# Patient Record
Sex: MALE | Race: OTHER | Employment: UNEMPLOYED | URBAN - METROPOLITAN AREA
[De-identification: names, ages, dates, MRNs, and addresses within clinical notes are randomized per-mention and may not be internally consistent; named-entity substitution may affect disease eponyms.]

---

## 2019-05-21 NOTE — BH LEVEL OF CARE ASSESSMENT
Level of Care Assessment Note    General Questions  Why are you here?: \"I tried to fuck all my shit up today so I had nothing to come back to. I have been having suicidal thoughts and still do but just tried to ruin everything I had going. \"  Precipitatin specifically say what his plan today was. He has a history of attempting to hang himself, slit his wrists and overdose.  PT stated he was trying to think of anything he could do today after messing up the areas of his life to ensure he would have nothing to and stopping him from getting any support. Depression Symptoms: Change in energy level;Feelings of worthlessness;Isolative; Feelings of helplessness; Loss of interest  Depression Description: Pt states he has nothing to live for and wants to end his life. illicit/prescription drugs have you used/abused?: Denies                                                                Support for Recovery  Is your living environment a supportive place for recovery?: No     Withdrawal Symptoms  History of Withdrawal Sym Pt trespassed on property today in order to get in trouble.    Thought Patterns  Clarity/Relevance: Coherent  Flow: Organized  Content: Paranoid ideation  Level of Consciousness: Alert  Level of Consciousness: Alert  Behavior  Exhibited behavior: Appropriat Yes    Primary Psychiatric Diagnosis  Depressive Disorders: Unspecified Depressive Disorder                               SRAT Review  Behavioral Precautions: Suicide  Medical Precautions: None

## 2019-05-21 NOTE — ED INITIAL ASSESSMENT (HPI)
Pt to ER via EMS for psych eval.  Pt states he was trespassing at [facility] and the called 911. Pt states he has been off his medications for 2 months. Pt states he was on Depakote and trazodone. Pt states he does have SI.  Pt states he struggles with tho

## 2019-05-22 PROBLEM — F25.9 SCHIZOAFFECTIVE DISORDER (HCC): Status: ACTIVE | Noted: 2019-05-22

## 2019-05-22 NOTE — BH PROGRESS NOTE
At this time, there is no clinically appropriate bed at [facility].  Lisbeth Fletcher, spoke to [facility] KAREEM HERNANDEZ, to confirm, and then this writer called and spoke with Glennis Cabot at [place], to explain that [facility] is working closely with [employer], and we will call [employer] in t

## 2019-05-22 NOTE — CONSULTS
[facility]    Report of Consultation    Medina Liaocat Patient Status:  Emergency    6/10/1995 MRN [id number]   Location [address] Attending No att. providers found   Hosp Day # 0 PCP None Pcp     Date of attempting to hang himself slitting his wrist and overdose. Patient reported that he has multiple plan in his mind how he can harm himself. Patient reported that there is nothing for him to live for.   Patient reported that he wish to go to [facility] so he The patient is alert and oriented x3. Stated age male well build laying down in bed very restless and disheveled. Patient demonstrating distracted and tangential thought process. Patient reporting feeling hopeless and helpless and depressed.   Patien prescriptions requested or ordered in this encounter           Deirdre Milligan MD  5/22/2019

## 2019-05-22 NOTE — ED PROVIDER NOTES
Patient Seen in: [facility] AND [facility] Emergency Department    History   Patient presents with:  Eval-P (psychiatric)    Stated Complaint: psych eval    HPI    Patient is a 21-year-old male who presents to the emergency department with a chief complaint of s oriented to person, place, and time. Skin: Skin is warm and dry. No rash noted. Psychiatric: His behavior is normal. His affect is labile. His speech is tangential. Cognition and memory are normal. He expresses impulsivity.  He expresses suicidal ideati Patient medically stable for psychiatric transfer.     MDM                         Disposition and Plan     Clinical Impression:  Paranoid schizophrenia ([facility] [address]  (primary encounter diagnosis)    Disposition:  Psychiatric transfer  5/21/2019  8:12 pm

## 2019-05-24 PROBLEM — F10.20 ALCOHOL USE DISORDER, SEVERE, DEPENDENCE (HCC): Status: ACTIVE | Noted: 2019-05-24

## 2019-06-04 NOTE — ED PROVIDER NOTES
Patient received in sign out from Dr. Conor Kidd. Awaits transfer for psychiatric evaluation and management for suicidal ideation and homicidal ideation. He has remained calm and cooperative throughout my shift. Signed out to Dr. Evette Espinoza.

## 2019-06-04 NOTE — ED NOTES
Pt ate 75% of his breakfast tray and has ambulated to the restroom. No further request at this time.
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
## 2019-06-04 NOTE — ED INITIAL ASSESSMENT (HPI)
Pt called 911 due to feeling \"Like I was going to go over the edge\" admits to SI. reports drinking 1/2 gal Vinicio Silva.  Non compliant with meds

## 2019-06-04 NOTE — ED NOTES
Eugene Singh @ [place] requested 24 hour monitoring of patient due to Etoh abuse. Requesting EKG, current vitals, and any medications given in ED.

## 2019-06-04 NOTE — ED INITIAL ASSESSMENT (HPI)
Pt reports that people are trying to hurt him and that he is prepared to defend himself if he needs to.  States that he would rather kill himself than kill others so he is suicidal.

## 2019-06-04 NOTE — ED PROVIDER NOTES
Patient Seen in: [facility] AND [facility] Emergency Department    History   Patient presents with:  Althea-P (psychiatric)    Stated Complaint: psych     HPI    25 yo M with PMH schizoaffective presneting for evaluation of suicidal/homicidal ideation with onset at 12-18 beer a day    Drug use: Yes      Types: Cannabis      Comment: started using approx 12 yrs ago. LSD, Heroin, Marijuana, cocaine, ecstasy.  Reports using only marijuana currently - 5/22/19      Review of Systems :  Constitutional: As per HPI  Psychiatr limits   VALPROIC ACID, (DEPAKENE) - Abnormal; Notable for the following components:    Valproic Acid 6.5 (*)     All other components within normal limits   CBC W/ DIFFERENTIAL - Abnormal; Notable for the following components:    WBC 13.5 (*)     Neutroph

## 2019-06-04 NOTE — ED NOTES
[facility] @ [place] requesting pt be monitored for alcohol withdrawal for 24 hours. On 6/5/19 in morning, please fax EKG, vitals and any meds given in ED.

## 2019-06-05 NOTE — PROGRESS NOTES
Sy Artis is a 24year old  single male with chronic history of schizoaffective disorder who presented to the hospital with alcohol blood concentration 233 reporting suicidal ideation with a plan to hang himself.   The patient seen today fo use: Yes      Types: Cannabis      Comment: started using approx 12 yrs ago. LSD, Heroin, Marijuana, cocaine, ecstasy.  Reports using only marijuana currently - 5/22/19       Medications (Active prior to today's visit):    Current Facility-Administered Medi severity.   The patient with history of bipolar disorder and recent episode of depression with multiple suicidal attempt in the past and instability of his emotion.  Patient has not been compliant on medication and demonstrating inability to care for self i

## 2019-06-05 NOTE — ED NOTES
Pt remains asleep. Po fluids within reach. Er tech remains outside room for seclusion. Will continue to monitor.

## 2019-06-05 NOTE — ED NOTES
Met with Zeke Lou. He continues to have suicidal thoughts/urges to hang himself. New Petition completed.

## 2019-06-05 NOTE — ED NOTES
Pt remains awake, calm, cooperative. PO fluids within reach. Er tech outside room for seclusion. Will continue to monitor.

## 2019-06-05 NOTE — ED NOTES
Kassandra Nolan from [facility] called back - deflected pt due to CIWA of 6 at 0500 6/5/19.  Kassandra Nolan requested we follow up with him tomorrow re: transfer request. Kassandra Nolan advised they would require pt to not be exhibiting withdrawal sx w/out medications in order to be trans

## 2019-06-05 NOTE — CONSULTS
[facility]    Report of Consultation    Catarina Guajardo Patient Status:  Emergency    6/10/1995 MRN [id number]   Location [address] Attending Brain Lee MD   Hosp Day # 0 PCP None Pcp     Date o that he has been drinking half a gallon of whiskey upon availability. Patient reported when he drink his thought has been calm and he has no voices but then he wake up in the morning and the voices very loud in his head.     The patient has demonstrating s  (H) 06/03/2019    CA 8.8 06/03/2019    ALB 4.1 06/03/2019    ALKPHO 103 06/03/2019    TP 8.0 06/03/2019    AST 63 (H) 06/03/2019    ALT 88 (H) 06/03/2019    MG 1.9 05/28/2019    ETOH 233 (H) 06/03/2019         Imaging:        Vital Signs:     Blood ER attending, RN and  and agree treatment plan    Orders This Visit:  Orders Placed This Encounter      Drug Abuse Panel 10 screen STAT      CBC With Differential With Platelet      Basic Metabolic Panel (8)      Ethyl Alcohol      Hepatic Fun

## 2019-06-05 NOTE — ED NOTES
Pt asleep. When woken up, pt c/o feeling \"shaky\". Pt is calm and cooperative. Will continue to monitor.

## 2019-06-05 NOTE — ED NOTES
Med rec done. Pt resting on cart, speaking in full sentences, RR even and nonlabored. Updated on plan of care, verbalizes understanding. Breakfast ordered and given water.

## 2019-06-05 NOTE — ED NOTES
Pt offered shower and ambulation, declined at this time. Resting on cart watching TV. Lunch tray ordered.

## 2019-06-05 NOTE — ED NOTES
Spoke to [facility] in [place]. They will follow up with [facility] this morning regarding bed availability.

## 2019-06-06 NOTE — ED NOTES
PT safe to transport to [facility] per MD. Report given to Vincent Jones at [place] [id number] with superior.  Belonings given to medic for transport

## 2019-12-29 ENCOUNTER — APPOINTMENT (OUTPATIENT)
Dept: GENERAL RADIOLOGY | Facility: HOSPITAL | Age: 24
End: 2019-12-29
Attending: EMERGENCY MEDICINE
Payer: MEDICAID

## 2019-12-29 ENCOUNTER — APPOINTMENT (OUTPATIENT)
Dept: ULTRASOUND IMAGING | Facility: HOSPITAL | Age: 24
End: 2019-12-29
Attending: EMERGENCY MEDICINE
Payer: MEDICAID

## 2019-12-29 PROCEDURE — 93971 EXTREMITY STUDY: CPT | Performed by: EMERGENCY MEDICINE

## 2019-12-29 PROCEDURE — 73630 X-RAY EXAM OF FOOT: CPT | Performed by: EMERGENCY MEDICINE

## 2019-12-29 NOTE — ED PROVIDER NOTES
Patient Seen in: [facility] AND [facility] Emergency Department      History   Patient presents with:  Eval-P    Stated Complaint: psych eval, RLE redness and swelling    HPI    24-year-old male presenting for evaluation of suicidal ideation as well as right jed Marijuana use May 2019. Review of Systems    Positive for stated complaint: psych eval, RLE redness and swelling  Other systems are as noted in HPI. Constitutional and vital signs reviewed.       All other systems reviewed and negative except a components:    Ethyl Alcohol 5 (*)     All other components within normal limits   ACETAMINOPHEN (TYLENOL), S - Abnormal; Notable for the following components:    Acetaminophen <2.0 (*)     All other components within normal limits   SALICYLATE, SERUM - Ab MDM     41-year-old male presenting for evaluation of right heel pain as well as suicidal ideation. Overall well-appearing and hemodynamically stable.   He is calm and cooperative on exam.  Ultrasound negative for DVT, x-ray of the foot is nega

## 2019-12-29 NOTE — BH LEVEL OF CARE ASSESSMENT
Level of Care Assessment Note    General Questions  Why are you here?: \"I just can't do this any more. \" Pt continues to state that he wants to kill himself because of the pain in his foot.   Precipitating Events: Pt reports having surgery on his foot and Ideation;Method/Plan;Intention;Rehersal/Research; Attempt  Describe: Pt reports a hx of ODing.   Family History or Personal Lived Experience of Loss or Near Loss by Suicide: Denies    Danger to Others/Property  Have you harmed someone or had thoughts about w No    IBW Calculations  Weight: 217 lb  BMI (Calculated): 29.4  IBW LBS [place]: 178 LBS  IBW %: 121.91 %  IBW + 10%: 195.8 LBS  IBW - 10%: 160.2 LBS  SCOFF Questionnaire  Do you make yourself Sick because you feel uncomfortably full?: No  Do you worry that sobriety/abstinence?: refuses to answer                   Sedative Use  Age at first use?: refuses to comment  Route: Oral  Average current amount used? : 2 bars Xanax  How long with this pattern of use?: years  Last Use?: yesterday  Longest period of sobr Affect: Flat  Stability of Affect: Stable  Attitude toward staff: Hostile;Guarded  Speech  Rate of Speech: Appropriate  Flow of Speech: Appropriate  Intensity of Volume: Ordinary  Clarity: Clear  Cognition  Concentration: Impaired  Memory: Recent memory in decision making  Protective Factors: cannot identify    Motivational Stage of Change  Motivational Stage of Change: Pre-Contemplative    Level of Care Recommendations  Consulted with: Dr. Sabrina Swenson  Level of Care Recommendation: Inpatient Acute Care  Unit:  Ad

## 2019-12-29 NOTE — ED INITIAL ASSESSMENT (HPI)
Arrived with EMS for c/o psych eval and RLE pain and swelling. Re Anglin was picked up in front of the [employer]). He reported to EMS that he felt suicidal and intended to overdose.

## 2019-12-29 NOTE — ED NOTES
Pt admits to daily intake of alcohol \"because I just want to give up. I don't want to live anymore. \" His plan is to overdose on injectable heroin which he admits to using in the past. States he has been actively searching for heroin but has only been abl

## 2019-12-29 NOTE — ED NOTES
Angeline Byrne has remained calm and cooperative thus far, he has slept the majority of the time. Care endorsed to JORDON Jones.

## 2019-12-30 NOTE — ED NOTES
This writer spoke to [facility] intake and they have received the updated information. RN report can be given to [phone] to [facility].

## 2020-01-12 NOTE — ED NOTES
Called back [facility] in [facility]. Spoke to [facility]. She reports she is still waiting to hear back from the unit.

## 2020-01-12 NOTE — ED NOTES
Called [employer]. Spoke to dispatch. [facility] is not on a police hold. Officer on the scene was Belinda, badge number [id number] .

## 2020-01-12 NOTE — ED NOTES
Pt endorsing SI. States he is going to OD on heroin. Pt reports drinking vodka today.  States \"I drank a lot\"

## 2020-01-12 NOTE — ED PROVIDER NOTES
Patient remained calm and cooperative during my shift. He was reevaluated when sober and continues to state that he has suicidal and homicidal ideations.   He told another staff member that he plan to OD on heroin to kill himself which she has done in the

## 2020-01-12 NOTE — ED PROVIDER NOTES
Patient Seen in: [facility] AND [facility] Emergency Department      History   Patient presents with:  Ezequiel    Stated Complaint: etoh    HPI    76-year-old male history of bipolar affective disorder, homelessness, brought in by police and EMS for alcohol intox dysuria, flank pain and frequency. Musculoskeletal: Negative for back pain. Skin: Negative for rash. Neurological: Negative for weakness, light-headedness and headaches. Psychiatric/Behavioral: Positive for suicidal ideas.    All other systems revie normal finger to nose b/l, normal gait, no facial asymmetry, normal speech     Psychiatric:      Comments: + SI without a plan, no HI, no hallucinations           ED Course   EKG: rate 89, rhythm regular, axis normal, no acute ST changes  Interpretation: n Amphetamine Urine Negative Negative    Barbiturates Urine Negative Negative    Benzodiazepines Urine Negative Negative    Cocaine Urine Presumed Positive (A) Negative    PCP Urine Negative Negative    Cannabinoid Urine Negative Negative    Opiate Urine Neg transfer vs. Dc home    The patient is currently a smoker. I spent greater than 3 minutes counseling the patient on smoking cessation. I explained the risks of smoking including chronic lung disease, lung cancer, heart attacks, and strokes.   I advised th Prescribed:  Current Discharge Medication List

## 2020-01-12 NOTE — ED NOTES
Called MACKENZIE Galvez. Spoke to [facility]. She requested an EKG which has now been obtained. Referral packet faxed.

## 2020-01-12 NOTE — ED NOTES
This writer took over 1:1 seclusion of patient. Patient is currently asleep on the cart, RR is easy and non-labored. Will continue to monitor.

## 2020-01-12 NOTE — CONSULTS
[facility] HOSP - [facility]    Report of Consultation    Jong Lynn Patient Status:  Emergency    6/10/1995 MRN [id number]   Location [address] Attending Beth Tesfaye MD   Hosp Day # 0 PCP None Pcp     Date of Adm function living in a shelter and not following up on his treatment. The patient with history of schizoaffective disorder with continuation of paranoia and occasional hallucination.     The patient denied today any homicidal ideation although reported that TP 8.0 06/03/2019    AST 63 (H) 06/03/2019    ALT 88 (H) 06/03/2019    PTT 31.5 12/29/2019    INR 0.91 12/29/2019    PTP 12.0 12/29/2019    MG 1.9 05/28/2019    ETOH 285 (H) 01/11/2020         Imaging:        Vital Signs:     Blood pressure 133/87, puls Restart Depakote 500 mg twice daily. 4.  Restart Zyprexa 15 mg nightly. 5.  Patient has not been exhibiting any withdrawal symptoms or signs no need for detox.   6.  Communicate with ER attending, RN and  and agree treatment plan       Orders

## 2020-01-12 NOTE — ED NOTES
Call back from [employer] with [facility]. Their psychiatrist declined due tot he current acuity on their unit.

## 2020-01-12 NOTE — ED NOTES
Called back [facility]. Spoke to [place]. Packet is being reviewed by the floor.

## 2020-01-12 NOTE — ED NOTES
Called [facility] in [facility]. They have no male beds available.   Some possible discharges on Monday 1/13/20

## 2020-01-12 NOTE — ED NOTES
Patient endorsed to me per primary RN, Gail Richards. Patient sleeping on gurney. Seclusion 1:1 sitter/observation in progress. Plan is to reassess patient once he is clinically sober around 8 am by psych liaison.

## 2020-01-12 NOTE — ED NOTES
Spoke to [facility] at [facility] in [place]. Referral packet faxed.

## 2020-01-12 NOTE — ED NOTES
Pt presented to ED with pills on his person    Divalproex Sodium 500mg, take 2 pills daily  Trazadone.  100mg at night  Olazapine, 15mg, take 1 pill daily

## 2020-01-12 NOTE — ED NOTES
Call back from Debi Cancel with [facility]. They are unable to accommodate with a CIWA of 12. Patient would have to be off Xanax for 24 hours. Also she stated that anyone with HI must go to their acute unit and there are currently no beds on that unit.

## 2020-01-12 NOTE — ED NOTES
Spoke to Don Fernandez in [employer] Intake at [facility].  She requested a CIWA score which has been completed. Faxed referral packet with CIWA results to [address] [place] [address].

## 2020-01-12 NOTE — ED NOTES
Called back [employer]. Spoke to [facility]. No bed available at [facility] in [place]. No bed available and no known discharges.
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
## 2020-01-12 NOTE — ED NOTES
Call from [facility] at [facility]. She requested updated vitals, EKG and CIWA. Requested information sent.

## 2020-01-12 NOTE — ED NOTES
Pt ate breaded chicken tenders with thick cut fires for lunch, no sign of nausea. Did complain of a  \"stomach ache\". Would prefer to see food menu before ordering food.

## 2020-01-12 NOTE — ED NOTES
Call back from [facility] with Caty Vanegas. No bed at [facility] or [facility]. [facility] does not accept Medicaid insurance.   [facility] is waiting to hear back from [facility], but she stated they have multiple people being assessed in their ER and ma

## 2020-01-13 NOTE — ED NOTES
[facility] accepted the referral but states that the male unit is on hold until 9 AM which is when a decision can be made.

## 2020-01-13 NOTE — ED NOTES
Patient was waken up to take vitals as soon as tech left room patient fell asleep will continue to monitor.

## 2020-01-13 NOTE — ED NOTES
Patient medicated with tylenol for dental pain. No needs at this time. Remains in one to one observation. Pending transfer at this time.

## 2020-01-13 NOTE — ED NOTES
TRANSFER SUMMARY:    [facility] in [place], has been reviewing for over 6  Hours. Now requesting all new labs.      [facility] in [place], no bed available and no known discharges    [facility], have left multiple messages and have received no call

## 2020-01-13 NOTE — ED NOTES
Pt endorsed to me by Dr. Cherylene League for continuation of care - pt awaiting inpt psych transfer as has been calm throughout my shift. Pt endorsed to  [address] for continuation of care.

## 2020-01-13 NOTE — ED NOTES
[facility] - No beds  [facility] - [facility], still pending review.    [facility] - CYRIL, informed on voicemail to fax packet for review

## 2020-01-13 NOTE — ED NOTES
Pt is awake and alert, respirations nonlabored, is speaking in full sentences. No complaints at this time. Given sandwich and juice.

## 2020-01-13 NOTE — ED NOTES
Referral made to [facility]. [facility] who states that their overflow unit will be opening up around 7 AM but the packet can be faxed for review by RN supervisor.

## 2020-01-13 NOTE — ED NOTES
This writer left message for [employer] to follow up on referral made.      Packet is currently being reviewed by [facility].

## 2020-01-13 NOTE — ED NOTES
Called back [facility] in [facility]. Spoke to [employer]. Explained that I have had multiple conversations with [facility] and have been waiting to hear back about a bed request that I was told was sent to the unit.  She states that she will follow

## 2020-01-13 NOTE — ED NOTES
Patient was accepted to [facility] under Dr. Jennifer Vee. Holli from intake indicated that they will be opening their overflow unit around 730 - 8 AM. At that time, the new charge RN will call for report and discuss transportation.

## 2020-01-13 NOTE — ED NOTES
[facility] deflected as they do not have any beds at this time. Phone referral was made to [facility]. This writer will fax packet for review.

## 2020-01-13 NOTE — ED NOTES
This tech took over observation. Pt currently resting on cart and watching tv, will continue to monitor.

## 2020-09-17 NOTE — ED NOTES
Patient  given copy of dc instructions and  script(s). Patient verbalized understanding of instructions and script (s). Patient given a current medication reconciliation form and verbalized understanding of their medications. Patient  verbalized understanding of the importance of discussing medications with  his or her physician or clinic they will be following up with. Patient alert and oriented and in no acute distress. Patient discharged home ambulatory with family member. Pt resting comfortably. Additional PO fluids provided. States that he is \"feeling better now\". ER tech at bedside for seclusion. Will continue to monitor.

## 2021-01-14 NOTE — ED NOTES
At 23:30 pct walked pt to the shower, pt was calm and cooperative. pct and security were outside the door and no indication of distress. Pt was brought back to room 25, pt is resting in bed.
Bruising noted to upper left back. Abrasions noted to left knee-non draining.
Call from [place] with [facility]. They have no male beds tonight. Mary added that they are expecting discharges tomorrow.
Dr. Katelyn Jane completed 2nd cert for patient.
Faxed H&P and medical clearance to [facility]
Observation hand off to day tech. Detailed report provided by night tech.
Packet faxed to [employer] and [facility] notified of pt
Per Edwin Brooks @ [place] no male beds today. Refaxed packet.
Psych Liason at bedside.
Pt accepted at [facility] under Dr. Berto Muller. He will be going to [address]. RN to RN is [phone].
Pt c/o headache.  Sender notified. Ok for Ibuprofen 600 mg PO x1 dose.
Pt calm and cooperative. Pt appears depressed and reports he feels \"hopeless. \"
Pt resting on cart, denies needs or complaints at this time. ERT remains at bedside for seclusion.
Pt returned back form shower with tech lona and security. Pt is now in room resting comfortable on cart.
Pt was escorted with tech lona and security to the shower area at this time
Report called Cal Avalos at [facility]. [facility] called for BLS transport to [facility], ETA 30-45 minutes.
[facility] at bedside for transport.
General Sunscreen Counseling: I recommended a broad spectrum sunscreen with a SPF of 45 or higher.  I explained that SPF 45 sunscreens block approximately 97 percent of the sun's harmful rays.  Sunscreens should be applied at least 15 minutes prior to expected sun exposure and then every 2 hours after that as long as sun exposure continues. If swimming or exercising sunscreen should be reapplied every 45 minutes to an hour after getting wet or sweating.  One ounce, or the equivalent of a shot glass full of sunscreen, is adequate to protect the skin not covered by a bathing suit. I also recommended a lip balm with a sunscreen as well. Sun protective clothing can be used in lieu of sunscreen but must be worn the entire time you are exposed to the sun's rays.
Detail Level: Generalized
